# Patient Record
Sex: FEMALE | Race: WHITE | HISPANIC OR LATINO | URBAN - METROPOLITAN AREA
[De-identification: names, ages, dates, MRNs, and addresses within clinical notes are randomized per-mention and may not be internally consistent; named-entity substitution may affect disease eponyms.]

---

## 2021-04-14 ENCOUNTER — APPOINTMENT (OUTPATIENT)
Age: 56
Setting detail: DERMATOLOGY
End: 2021-04-15

## 2021-04-14 ENCOUNTER — APPOINTMENT (OUTPATIENT)
Age: 56
Setting detail: DERMATOLOGY
End: 2021-04-18

## 2021-04-14 DIAGNOSIS — L57.8 OTHER SKIN CHANGES DUE TO CHRONIC EXPOSURE TO NONIONIZING RADIATION: ICD-10-CM

## 2021-04-14 DIAGNOSIS — Z48.1 ENCOUNTER FOR PLANNED POSTPROCEDURAL WOUND CLOSURE: ICD-10-CM

## 2021-04-14 PROBLEM — C44.319 BASAL CELL CARCINOMA OF SKIN OF OTHER PARTS OF FACE: Status: ACTIVE | Noted: 2021-04-14

## 2021-04-14 PROBLEM — C44.612 BASAL CELL CARCINOMA OF SKIN OF RIGHT UPPER LIMB, INCLUDING SHOULDER: Status: ACTIVE | Noted: 2021-04-14

## 2021-04-14 PROCEDURE — 17311 MOHS 1 STAGE H/N/HF/G: CPT

## 2021-04-14 PROCEDURE — OTHER COUNSELING: OTHER

## 2021-04-14 PROCEDURE — OTHER MOHS SURGERY: OTHER

## 2021-04-14 PROCEDURE — 99203 OFFICE O/P NEW LOW 30 MIN: CPT | Mod: 57

## 2021-04-14 PROCEDURE — OTHER MIPS QUALITY: OTHER

## 2021-04-14 PROCEDURE — OTHER DEFER: OTHER

## 2021-04-14 PROCEDURE — OTHER CONSULTATION EXCISION: OTHER

## 2021-04-14 PROCEDURE — OTHER REPAIR NOTE: OTHER

## 2021-04-14 PROCEDURE — 99203 OFFICE O/P NEW LOW 30 MIN: CPT | Mod: 25

## 2021-04-14 PROCEDURE — OTHER CONSULTATION FOR SURGICAL REPAIR: OTHER

## 2021-04-14 PROCEDURE — OTHER CONSULTATION FOR MOHS SURGERY: OTHER

## 2021-04-14 PROCEDURE — 14040 TIS TRNFR F/C/C/M/N/A/G/H/F: CPT

## 2021-04-14 ASSESSMENT — LOCATION ZONE DERM
LOCATION ZONE: FACE
LOCATION ZONE: FACE

## 2021-04-14 ASSESSMENT — LOCATION SIMPLE DESCRIPTION DERM
LOCATION SIMPLE: LEFT TEMPLE
LOCATION SIMPLE: RIGHT CHEEK

## 2021-04-14 ASSESSMENT — LOCATION DETAILED DESCRIPTION DERM
LOCATION DETAILED: RIGHT INFERIOR CENTRAL MALAR CHEEK
LOCATION DETAILED: LEFT MID TEMPLE

## 2021-04-14 NOTE — PROCEDURE: CONSULTATION FOR MOHS SURGERY
Name Of The Referring Provider For Procedure: Angelia Doran MD Name Of The Referring Provider For Procedure: Aneglia Doran MD

## 2021-04-14 NOTE — PROCEDURE: CONSULTATION FOR SURGICAL REPAIR
Size Of Defect: 1.4
Other Plan: Mohs repair
X Size Of Defect In Cm (Optional): 1.1
Detail Level: Simple

## 2021-04-14 NOTE — PROCEDURE: CONSULTATION EXCISION
Anatomic Location From Referring Provider: right upper arm
Detail Level: Simple
X Size Of Lesion In Cm (Optional): 0

## 2021-04-14 NOTE — PROCEDURE: DEFER
Procedure To Be Performed At Next Visit: Excision
Introduction Text (Please End With A Colon): .
Detail Level: Detailed
Body Location Override (Optional - Billing Will Still Be Based On Selected Body Map Location If Applicable): right upper arm

## 2021-04-21 ENCOUNTER — APPOINTMENT (OUTPATIENT)
Age: 56
Setting detail: DERMATOLOGY
End: 2021-04-23

## 2021-04-21 DIAGNOSIS — Z48.02 ENCOUNTER FOR REMOVAL OF SUTURES: ICD-10-CM

## 2021-04-21 PROCEDURE — OTHER SUTURE REMOVAL (GLOBAL PERIOD): OTHER

## 2021-04-21 PROCEDURE — 99024 POSTOP FOLLOW-UP VISIT: CPT

## 2021-04-21 ASSESSMENT — LOCATION ZONE DERM: LOCATION ZONE: FACE

## 2021-04-21 ASSESSMENT — LOCATION DETAILED DESCRIPTION DERM: LOCATION DETAILED: LEFT SUPERIOR TEMPLE

## 2021-04-21 ASSESSMENT — LOCATION SIMPLE DESCRIPTION DERM: LOCATION SIMPLE: LEFT TEMPLE

## 2021-04-21 NOTE — PROCEDURE: SUTURE REMOVAL (GLOBAL PERIOD)
Detail Level: Detailed
Add 27641 Cpt? (Important Note: In 2017 The Use Of 03652 Is Being Tracked By Cms To Determine Future Global Period Reimbursement For Global Periods): yes

## 2021-05-03 ENCOUNTER — APPOINTMENT (OUTPATIENT)
Age: 56
Setting detail: DERMATOLOGY
End: 2021-05-05

## 2021-05-03 PROBLEM — C44.612 BASAL CELL CARCINOMA OF SKIN OF RIGHT UPPER LIMB, INCLUDING SHOULDER: Status: ACTIVE | Noted: 2021-05-03

## 2021-05-03 PROCEDURE — OTHER COUNSELING: OTHER

## 2021-05-03 PROCEDURE — 12032 INTMD RPR S/A/T/EXT 2.6-7.5: CPT | Mod: 79

## 2021-05-03 PROCEDURE — OTHER EXCISION: OTHER

## 2021-05-03 PROCEDURE — 11602 EXC TR-EXT MAL+MARG 1.1-2 CM: CPT | Mod: 79

## 2021-05-17 ENCOUNTER — APPOINTMENT (OUTPATIENT)
Age: 56
Setting detail: DERMATOLOGY
End: 2021-05-18

## 2021-05-17 DIAGNOSIS — Z48.02 ENCOUNTER FOR REMOVAL OF SUTURES: ICD-10-CM

## 2021-05-17 PROCEDURE — OTHER SUTURE REMOVAL (GLOBAL PERIOD): OTHER

## 2021-05-17 ASSESSMENT — LOCATION ZONE DERM: LOCATION ZONE: ARM

## 2021-05-17 ASSESSMENT — LOCATION SIMPLE DESCRIPTION DERM: LOCATION SIMPLE: RIGHT UPPER ARM

## 2021-05-17 ASSESSMENT — LOCATION DETAILED DESCRIPTION DERM: LOCATION DETAILED: RIGHT LATERAL PROXIMAL UPPER ARM

## 2021-05-17 NOTE — PROCEDURE: SUTURE REMOVAL (GLOBAL PERIOD)
Detail Level: Zone
Add 26066 Cpt? (Important Note: In 2017 The Use Of 62645 Is Being Tracked By Cms To Determine Future Global Period Reimbursement For Global Periods): no

## 2021-11-03 NOTE — PROCEDURE: CONSULTATION FOR MOHS SURGERY
Refill request from pharmacy for the medication listed below.  Patient was last seen 5/24/2021.  Next appt 12/10/2021.  Refilled per protocol.    Last written as  losartan (COZAAR) 50 MG tablet 90 tablet 1 5/3/2021     Sig - Route: Take 1 tablet by mouth daily. - Oral    Sent to pharmacy as: Losartan Potassium 50 MG Oral Tablet (COZAAR)    Class: Eprescribe            Pharmacy:  [facility]  Call when complete?  no      Drug Prescription Monitoring    Associated Diagnosis for Medication:  [place] PDMP Reviewed:  Date of Last Urine Drug Screen:  (If + for illegal substances do not fill; if negative for expected opiate do not fill)  Last Dispensed from Pharmacy:  New Refill Start Date Expected:  Next Refill After This Not Until:       Size Of Lesion: 0.6